# Patient Record
Sex: MALE | Race: WHITE | Employment: FULL TIME | URBAN - METROPOLITAN AREA
[De-identification: names, ages, dates, MRNs, and addresses within clinical notes are randomized per-mention and may not be internally consistent; named-entity substitution may affect disease eponyms.]

---

## 2020-05-12 ENCOUNTER — VIRTUAL PHONE E/M (OUTPATIENT)
Dept: FAMILY MEDICINE CLINIC | Facility: CLINIC | Age: 35
End: 2020-05-12
Payer: COMMERCIAL

## 2020-05-12 ENCOUNTER — APPOINTMENT (OUTPATIENT)
Dept: LAB | Age: 35
End: 2020-05-12
Attending: FAMILY MEDICINE
Payer: COMMERCIAL

## 2020-05-12 DIAGNOSIS — F41.9 ANXIETY: ICD-10-CM

## 2020-05-12 DIAGNOSIS — R10.2 PELVIC PAIN IN MALE: Primary | ICD-10-CM

## 2020-05-12 PROCEDURE — 36415 COLL VENOUS BLD VENIPUNCTURE: CPT | Performed by: FAMILY MEDICINE

## 2020-05-12 PROCEDURE — 87086 URINE CULTURE/COLONY COUNT: CPT | Performed by: FAMILY MEDICINE

## 2020-05-12 PROCEDURE — 81003 URINALYSIS AUTO W/O SCOPE: CPT | Performed by: FAMILY MEDICINE

## 2020-05-12 PROCEDURE — 85025 COMPLETE CBC W/AUTO DIFF WBC: CPT | Performed by: FAMILY MEDICINE

## 2020-05-12 PROCEDURE — 99203 OFFICE O/P NEW LOW 30 MIN: CPT | Performed by: FAMILY MEDICINE

## 2020-05-12 PROCEDURE — 80053 COMPREHEN METABOLIC PANEL: CPT | Performed by: FAMILY MEDICINE

## 2020-05-12 RX ORDER — FLUOXETINE HYDROCHLORIDE 20 MG/1
20 CAPSULE ORAL DAILY
COMMUNITY
End: 2020-05-12

## 2020-05-12 RX ORDER — FLUOXETINE HYDROCHLORIDE 20 MG/1
20 CAPSULE ORAL DAILY
Qty: 90 CAPSULE | Refills: 1 | Status: SHIPPED | OUTPATIENT
Start: 2020-05-12 | End: 2021-01-06

## 2020-05-12 RX ORDER — FLUOXETINE 20 MG/1
20 TABLET, FILM COATED ORAL DAILY
COMMUNITY
End: 2020-05-12

## 2020-05-12 NOTE — PROGRESS NOTES
Patient presents with:  Pelvic Pain: in male    Shawna Ohms verbally consents to a [employer] on 05/12/20.   Patient understands and accepts financial responsibility for any deductible, co-insurance and/or co-pays associated with this service History   Problem Relation Age of Onset   • No Known Problems Father    • Diabetes Mother         type II   • No Known Problems Daughter    • No Known Problems Son    • No Known Problems Sister    • No Known Problems Brother    • No Known Problems Sister

## 2020-07-20 ENCOUNTER — TELEPHONE (OUTPATIENT)
Dept: FAMILY MEDICINE CLINIC | Facility: CLINIC | Age: 35
End: 2020-07-20

## 2020-07-20 DIAGNOSIS — N41.9 PROSTATITIS, UNSPECIFIED PROSTATITIS TYPE: Primary | ICD-10-CM

## 2020-07-20 NOTE — TELEPHONE ENCOUNTER
C/o having phone appointment for pain in lower abd area has not gone away he says it feels like an infection. Discomfort in in region of the bladder occasionally has pain after voiding. Has this discomfort when sitting too long.  It tends to worse after int

## 2020-07-20 NOTE — TELEPHONE ENCOUNTER
Pt called to inform us that he scheduled an appt through [facility] Chart for Aug with Dr Ruiz Brown but meant to schedule this wk. Having Abdominal issues. Can we call him?

## 2020-07-20 NOTE — TELEPHONE ENCOUNTER
[address]  [phone]     Called and talked to patient gave him the name and number for the referral to Dr Sangita Quiles.  He will call and see how soon he can get in then if too long will call back here for an

## 2020-07-20 NOTE — TELEPHONE ENCOUNTER
I think he does need to be seen. It almost sounds like a prostatitis given the symptoms and the more chronic nature. Given the waits to get in here, it might be easier for him to see the orulogy team sooner - Alexia Blackmon.       I'm happy to see him t

## 2020-07-24 ENCOUNTER — OFFICE VISIT (OUTPATIENT)
Dept: SURGERY | Facility: CLINIC | Age: 35
End: 2020-07-24
Payer: COMMERCIAL

## 2020-07-24 VITALS — HEART RATE: 93 BPM | SYSTOLIC BLOOD PRESSURE: 131 MMHG | DIASTOLIC BLOOD PRESSURE: 86 MMHG

## 2020-07-24 DIAGNOSIS — R82.90 URINE FINDING: Primary | ICD-10-CM

## 2020-07-24 DIAGNOSIS — N41.0 ACUTE PROSTATITIS: ICD-10-CM

## 2020-07-24 DIAGNOSIS — R10.30 LOWER ABDOMINAL PAIN: ICD-10-CM

## 2020-07-24 LAB
APPEARANCE: CLEAR
MULTISTIX LOT#: 1044 NUMERIC
PH, URINE: 6 (ref 4.5–8)
SPECIFIC GRAVITY: 1.02 (ref 1–1.03)
URINE-COLOR: YELLOW
UROBILINOGEN,SEMI-QN: 0.2 MG/DL (ref 0–1.9)

## 2020-07-24 PROCEDURE — 99204 OFFICE O/P NEW MOD 45 MIN: CPT | Performed by: UROLOGY

## 2020-07-24 PROCEDURE — 81003 URINALYSIS AUTO W/O SCOPE: CPT | Performed by: UROLOGY

## 2020-07-24 PROCEDURE — 3075F SYST BP GE 130 - 139MM HG: CPT | Performed by: UROLOGY

## 2020-07-24 PROCEDURE — 3079F DIAST BP 80-89 MM HG: CPT | Performed by: UROLOGY

## 2020-07-24 RX ORDER — CIPROFLOXACIN 500 MG/1
500 TABLET, FILM COATED ORAL 2 TIMES DAILY
Qty: 56 TABLET | Refills: 0 | Status: SHIPPED | OUTPATIENT
Start: 2020-07-24 | End: 2020-08-21

## 2020-07-24 NOTE — H&P
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   He presents as a consult from Dr Samaria Navarro off prostatitis and he would like to do this. I would also recommend we check a CT SP to make sure he does not have a hernia. We will contact him with results.     Of note he did almost pass out when did his testicular exam. I caught him before he fell and w or tenderness  SKIN: no obvious rashes    PENIS: normal  URETHRAL MEATUS: normal  SCROTUM: normal, no rashes  SPERMATIC CORD: normal, symmetric, non-tender  TESTICLES: normal, both descended, symmetric, non-tender and without masses  EPIDIDYMIS: normal sarika

## 2020-08-06 ENCOUNTER — HOSPITAL ENCOUNTER (OUTPATIENT)
Age: 35
Discharge: HOME | End: 2020-08-06
Payer: COMMERCIAL

## 2020-08-06 VITALS
HEART RATE: 70 BPM | OXYGEN SATURATION: 100 % | RESPIRATION RATE: 16 BRPM | SYSTOLIC BLOOD PRESSURE: 106 MMHG | DIASTOLIC BLOOD PRESSURE: 68 MMHG | TEMPERATURE: 99 F

## 2020-08-06 DIAGNOSIS — L02.91 ABSCESS: Primary | ICD-10-CM

## 2020-08-06 PROCEDURE — 10060 I&D ABSCESS SIMPLE/SINGLE: CPT | Performed by: NURSE PRACTITIONER

## 2020-08-06 PROCEDURE — 99203 OFFICE O/P NEW LOW 30 MIN: CPT | Performed by: NURSE PRACTITIONER

## 2020-08-06 RX ORDER — ONDANSETRON 4 MG/1
4 TABLET, ORALLY DISINTEGRATING ORAL ONCE
Status: COMPLETED | OUTPATIENT
Start: 2020-08-06 | End: 2020-08-06

## 2020-08-06 NOTE — ED PROVIDER NOTES
Patient Seen in: [id number] [facility]      History   Patient presents with:  Abscess    Stated Complaint: cyst on back of neck    53-year-old male who presents himself to the immediate care complaints of an abscess to the posterior neck region Temp 98.7 °F (37.1 °C)   Temp src Temporal   SpO2 99 %   O2 Device None (Room air)       Current:/68   Pulse 70   Temp 98.7 °F (37.1 °C) (Temporal)   Resp 16   SpO2 100%         Physical Exam  Vitals signs and nursing note reviewed.    Constitutional: Did have did have a vasovagal episode while in the room during the procedure patient was laid flat presents the Trendelenburg position vitals were monitored patient was given Zofran and juice after 20 minutes patient felt better and is asymptomatic at this

## 2020-08-08 ENCOUNTER — HOSPITAL ENCOUNTER (OUTPATIENT)
Age: 35
Discharge: HOME | End: 2020-08-08
Payer: COMMERCIAL

## 2020-08-08 VITALS
HEART RATE: 67 BPM | RESPIRATION RATE: 16 BRPM | WEIGHT: 220 LBS | DIASTOLIC BLOOD PRESSURE: 81 MMHG | SYSTOLIC BLOOD PRESSURE: 139 MMHG | TEMPERATURE: 98 F | OXYGEN SATURATION: 100 %

## 2020-08-08 DIAGNOSIS — Z51.89 WOUND CHECK, ABSCESS: Primary | ICD-10-CM

## 2020-08-08 DIAGNOSIS — L02.11 NECK ABSCESS: ICD-10-CM

## 2020-08-08 NOTE — ED PROVIDER NOTES
Patient Seen in: [id number] [facility]      History   Patient presents with:  Wound Recheck    Stated Complaint: F/up x 2 days ago    HPI    CHIEF COMPLAINT: Packing removal, wound check     HISTORY OF PRESENT ILLNESS: Patient is a 24-year-old above.    Physical Exam     ED Triage Vitals [08/08/20 0806]   /81   Pulse 67   Resp 16   Temp 98.3 °F (36.8 °C)   Temp src Temporal   SpO2 100 %   O2 Device None (Room air)       Current:/81   Pulse 67   Temp 98.3 °F (36.8 °C) (Temporal)   Res

## 2020-08-08 NOTE — ED INITIAL ASSESSMENT (HPI)
Packing to abscess in the back of his neck 2 days ago, here to have the packing removed. Continues to ooze.  States it feels a lot better

## 2020-12-29 DIAGNOSIS — F41.9 ANXIETY: ICD-10-CM

## 2020-12-29 NOTE — TELEPHONE ENCOUNTER
Requested Prescriptions     Pending Prescriptions Disp Refills   • FLUOXETINE HCL 20 MG Oral Cap [Pharmacy Med Name: FLUOXETINE 20MG CAPSULES] 90 capsule 1     Sig: TAKE 1 CAPSULE(20 MG) BY MOUTH DAILY     LOV 05/12/2020     Patient was asked to follow-up

## 2020-12-30 RX ORDER — FLUOXETINE HYDROCHLORIDE 20 MG/1
CAPSULE ORAL
Qty: 90 CAPSULE | Refills: 1 | OUTPATIENT
Start: 2020-12-30

## 2020-12-30 NOTE — TELEPHONE ENCOUNTER
This rx will be denied. Patient was contacted and it was confirmed that patient has enough medication at this time until his appointment.

## 2021-01-06 NOTE — PROGRESS NOTES
Virtual Telephone Check-In    German Fall verbally consents to a Virtual/Telephone Check-In visit on 01/06/21. Patient has been referred to the [facility] website at [email or web address] to review the yearly Consent to Treat document.     Patient un

## 2021-10-04 DIAGNOSIS — F41.9 ANXIETY: ICD-10-CM

## 2021-10-05 RX ORDER — FLUOXETINE HYDROCHLORIDE 20 MG/1
20 CAPSULE ORAL DAILY
Qty: 90 CAPSULE | Refills: 0 | Status: SHIPPED | OUTPATIENT
Start: 2021-10-05

## 2021-10-05 NOTE — TELEPHONE ENCOUNTER
Refill request for:    Requested Prescriptions     Pending Prescriptions Disp Refills   • FLUOXETINE 20 MG Oral Cap [Pharmacy Med Name: FLUOXETINE 20MG CAPSULES] 90 capsule 1     Sig: TAKE 1 CAPSULE(20 MG) BY MOUTH DAILY        Last Prescribed Quantity Ref